# Patient Record
Sex: FEMALE | Race: WHITE
[De-identification: names, ages, dates, MRNs, and addresses within clinical notes are randomized per-mention and may not be internally consistent; named-entity substitution may affect disease eponyms.]

---

## 2018-12-10 ENCOUNTER — HOSPITAL ENCOUNTER (EMERGENCY)
Dept: HOSPITAL 74 - JER | Age: 33
Discharge: HOME | End: 2018-12-10
Payer: COMMERCIAL

## 2018-12-10 VITALS — DIASTOLIC BLOOD PRESSURE: 60 MMHG | TEMPERATURE: 98 F | SYSTOLIC BLOOD PRESSURE: 131 MMHG

## 2018-12-10 VITALS — BODY MASS INDEX: 18.5 KG/M2

## 2018-12-10 VITALS — HEART RATE: 71 BPM

## 2018-12-10 DIAGNOSIS — R09.89: Primary | ICD-10-CM

## 2018-12-10 LAB
APPEARANCE UR: CLEAR
BACTERIA #/AREA URNS HPF: (no result) /HPF
BILIRUB UR STRIP.AUTO-MCNC: NEGATIVE MG/DL
COLOR UR: (no result)
EPITH CASTS URNS QL MICRO: (no result) /HPF
KETONES UR QL STRIP: NEGATIVE
LEUKOCYTE ESTERASE UR QL STRIP.AUTO: NEGATIVE
MUCOUS THREADS URNS QL MICRO: (no result)
NITRITE UR QL STRIP: NEGATIVE
PH UR: 5 [PH] (ref 5–8)
PROT UR QL STRIP: NEGATIVE
PROT UR QL STRIP: NEGATIVE
SP GR UR: 1 (ref 1.01–1.03)
UROBILINOGEN UR STRIP-MCNC: NEGATIVE MG/DL (ref 0.2–1)

## 2018-12-10 NOTE — PDOC
Attending Attestation





- Resident


Resident Name: Maddy Adams





- ED Attending Attestation


I have performed the following: I have examined & evaluated the patient, The 

case was reviewed & discussed with the resident, I agree w/resident's findings 

& plan





- HPI


HPI: 





12/10/18 22:59








The patient is a 33 year old female, with no significant past medical history of

, who presents to the emergency department s/p foreign body choking. As per 

patient, she was eating when she began to choke on a cucumber. She endorses her 

 attempted to perform the Heimlich. On her way to the ED in the EMS, she 

notes the sensation went away and she believes she swallowed the cucumber.  





She denies recent fevers, chills, headache or dizziness. She denies recent 

nausea, vomit, diarrhea or constipation. She denies recent  dysuria, frequency, 

urgency or hematuria. She denies recent chest pain or shortness of breath.





Allergies: NKDA 


Past surgical history: None reported.


Social history: Nonsmoker. Denies EtOH use and recreational drug use. 











- Physicial Exam


PE: 





12/10/18 22:59








NAD, well appearing, +anxious, MMM, nl conjunctiva, anicteric; normal phonation

, airway intact, neck supple. lungs clear, no respiratory distress. RRR, 

abdomen soft nontender. HILLS x4. No peripheral edema. normal color for ethnicity

, WWP.











- Medical Decision Making





12/10/18 23:01





see hpi as documented


VS with mild tachy but also anxious. repeat VS normalized.


otherwise well appearing, maintaining secretions and airway.


lungs clear, 


+globus sensation


Soft tissue Xr and CXR clear, normal alignment and airway, no pulmonary edema 

or infiltrate.


tolerating PO intake at bedside


PCP followup, GI referral for eval of globus sensation and possible stricturing/

narrowing/shatski ring that may predispose


adequate chewing of food prior to swallowing


return precautions given, including bloody output, dehydration, vomiting,r 

espiratory distress, cyanosis, repeat episodes, or other worsening sx.


pt verbalized understanding of instructions, A/P.





12/10/18 23:03

## 2018-12-10 NOTE — PDOC
History of Present Illness





- General


Stated Complaint: Foreign Body (FB)


Time Seen by Provider: 12/10/18 20:49





- History of Present Illness


Initial Comments: 


Rafael Lopez is a 32yo otherwise healthy woman who presents with the sensation 

of choking. She reports that she was eating a salad at home tonight when she 

felt that she started choking on a piece of cucumber. She attempted to use a 

finger to remove the piece or to make herself vomit, but she lodged the 

cucumber farther into her esophagus/trachea. Her  then attempted to 

perform the Heimlich maneuver without improvement. She states that she was able 

to get "just a little" air throughout the incident. They called an ambulance, 

and she was brought to the ED. Ms Lopez reports that she felt the cucumber 

piece move just prior to arrival at the ED and believes that she has now 

swallowed it. She feels well currently. However, she still has a sensation of 

something "stuck" in the bottom of her throat, though she does report recent 

cold symptoms and thinks that it may be secondary to chest congestion. She 

feels that drinking water would entirely resolve her symptoms.








Past History





- Past Medical History


Allergies/Adverse Reactions: 


 Allergies











Allergy/AdvReac Type Severity Reaction Status Date / Time


 


No Known Allergies Allergy   Verified 12/10/18 21:03














- Suicide/Smoking/Psychosocial Hx


Smoking History: Never smoked


Have you smoked in the past 12 months: No


Information on smoking cessation initiated: No


Hx Alcohol Use: No


Drug/Substance Use Hx: No





**Review of Systems





- Review of Systems


Comments:: 


General: No fevers, no chills, no weight or appetite change, no malaise


HEENT: No changes in vision, no changes in hearing, no congestion, no sore 

throat


CV: No chest pain, no palpitations, no LE edema


Pulm: No SOB, no cough, no wheezing


GI: No nausea or vomiting, no change in bowel habits, no melena


: No frequency, no urgency, no dysuria


Musc: No back pain, no joint swelling, no recent injury


Skin: No rash, no lesions, no erythema


Endo: No excessive thirst, no heat/cold intolerance


Heme: No unusual bruising or bleeding, no swollen glands


Neuro: No syncope, no numbness/tingling, no focal weakness


Vasc: No claudication


Psych: No recent change in mood, no SI or HI











*Physical Exam





- Vital Signs


 Last Vital Signs











Temp Pulse Resp BP Pulse Ox


 


 98.0 F   105 H  20   131/60   100 


 


 12/10/18 21:03  12/10/18 21:03  12/10/18 21:03  12/10/18 21:03  12/10/18 21:03














- Physical Exam


Comments: 


General: Comfortable, no acute distress


HEENT: PERRL, EOMI, MMM, voice normal, normal neck ROM, no LAD


Cards: RRR, no murmur appreciated


Pulm: Comfortable on room air, clear to auscultation bilaterally


Abd: Soft, nontender, nondistended


Ext: Atraumatic. No LE edema. ROM intact. Strength 5/5 and equal bilaterally


Vasc: Extremities WWP. Palpable radial and pedal pulses bilaterally


Skin: Normal color, no rashes or lesions


Neuro: A&Ox3, CN grossly intact, normal speech, motor/sensory grossly intact 

and symmetric


Psych: Mood appropriate to situation











Moderate Sedation





- Procedure Monitoring


Vital Signs: 


Procedure Monitoring Vital Signs











Temperature  98.0 F   12/10/18 21:03


 


Pulse Rate  105 H  12/10/18 21:03


 


Respiratory Rate  20   12/10/18 21:03


 


Blood Pressure  131/60   12/10/18 21:03


 


O2 Sat by Pulse Oximetry (%)  100   12/10/18 21:03











ED Treatment Course





- RADIOLOGY


Radiology Studies Ordered: 














 Category Date Time Status


 


 CHEST PA & LAT [RAD] [facility]  12/10/18 20:59 Ordered


 


 NECK SOFT TISSUE [RAD] [facility]  12/10/18 20:58 Ordered














Medical Decision Making





- Medical Decision Making





12/10/18 21:20


Rafael Lopez is an otherwise healthy 32yo woman who presents after choking on 

a piece of cucumber at home tonight. Her symptoms resolved just prior to 

arrival in the ED.


- Breathing comfortably, no focal abnormalities on pulmonary exam


- Still has a foreign body sensation in her lower neck


- Neck soft tissue and chest xrays ordered for evaluation and to r/o continued 

foreign object


- Urine preg ordered prior to imaging; currently trying to get pregnant





12/11/18 23:04


- Xrays completed, reviewed, no acute abnormalities noted


- HR now in low 70's


- D/C home w/ referral to GI for continued throat discomfort





Discussed with Dr Jean.





Maddy Adams


PGY1








*DC/Admit/Observation/Transfer


Diagnosis at time of Disposition: 


 Choking sensation








- Discharge Dispostion


Disposition: HOME


Condition at time of disposition: Stable


Decision to Admit order: No





- Referrals


Referrals: 


Torsten Chin MD [Staff Physician] - 





- Patient Instructions


Printed Discharge Instructions:  DI for Choking Episode


Additional Instructions: 


Discharge Instructions:


You were seen in the emergency department following a choking episode at home. 

You had xrays to confirm that the piece of food was no longer stuck in your 

throat. 





Home Care:


- Make sure you chew all food thoroughly


- If you can breath, do not panic. You have time to seek medical attention. If 

you can cough, you can breath. 





Follow Up:


- Make an appointment to see your primary doctor within the next 1-2 weeks for 

follow up


- You have been referred to a GI doctor, Dr Chin. Call to make an 

appointment for within the next week. You may need additional testing if you 

continue to have the sensation of something stuck in your throat. 


- Seek immediate medical care if you have any medical emergencies including 

additional choking episodes





- Post Discharge Activity

## 2019-01-07 ENCOUNTER — HOSPITAL ENCOUNTER (EMERGENCY)
Dept: HOSPITAL 74 - FER | Age: 34
Discharge: HOME | End: 2019-01-07
Payer: COMMERCIAL
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
## 2019-01-07 DIAGNOSIS — T31.0: ICD-10-CM

## 2019-01-07 DIAGNOSIS — L03.113: ICD-10-CM
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
## 2019-01-07 DIAGNOSIS — T22.011A: Primary | ICD-10-CM

## 2019-01-07 NOTE — PDOC
History of Present Illness





<Zayra Zambrano - Last Filed: 01/07/19 22:40>





- History of Present Illness


Initial Comments: 





01/08/19 02:12


The patient is a 33 year old female, with no significant past medical history, 

who presents to the emergency department today complaining of a right forearm 

burn for 2 days. Patient notes she burned herself while trying to remove food 

from the oven, and hit her arm on the oven rack. Patient states the burned area 

became swollen and erythematous yesterday. She notes she is concerned that the 

burn may be infected, which promoted her visit to the ED today. Reports pain to 

the burn area. Denies blister formation. Tdap up to date.  





The patient denies chest pain, shortness of breath, headache and dizziness.


Denies fever, chills, nausea, vomit, diarrhea and constipation.


Denies dysuria, frequency, urgency and hematuria.





Allergies: NKA


Past surgical history: None reported


Social history: No reported








<Davina Mcgregor - Last Filed: 01/08/19 02:38>





- General


Chief Complaint: Burn


Stated Complaint: BURN TO LEFT FOREARM 2 DAYS AGO


Time Seen by Provider: 01/07/19 20:28





Past History





<Zayra Zambrano - Last Filed: 01/07/19 22:40>





- Past Medical History


COPD: No





- Immunization History


Immunization Up to Date: Yes





- Suicide/Smoking/Psychosocial Hx


Smoking History: Never smoked


Have you smoked in the past 12 months: No


Hx Alcohol Use: No


Drug/Substance Use Hx: No





<Davina Mcgregor - Last Filed: 01/08/19 02:38>





- Past Medical History


Allergies/Adverse Reactions: 


 Allergies











Allergy/AdvReac Type Severity Reaction Status Date / Time


 


No Known Allergies Allergy   Verified 12/10/18 21:03











Home Medications: 


Ambulatory Orders





Cephalexin Monohydrate [Keflex -] 500 mg PO Q6H #28 capsule 01/07/19 











**Review of Systems





- Review of Systems


Comments:: 





01/08/19 02:13


GENERAL/CONSTITUTIONAL: No fever or chills. No weakness.


HEAD, EYES, EARS, NOSE AND THROAT: No change in vision. No ear pain or 

discharge. No sore throat.


GASTROINTESTINAL:  No nausea, vomiting, diarrhea or constipation.


GENITOURINARY: No dysuria, frequency, or change in urination.


CARDIOVASCULAR: No chest pain or shortness of breath.


RESPIRATORY: No cough, wheezing, or hemoptysis.


MUSCULOSKELETAL: No joint or muscle swelling or pain. No neck or back pain.


SKIN: +Right forearm burn with associated swelling and erythema. No rash


NEUROLOGIC: No headache, vertigo, loss of consciousness, or change in strength/

sensation.


ENDOCRINE: No increased thirst. No abnormal weight change.


HEMATOLOGIC/LYMPHATIC: No anemia, easy bleeding, or history of blood clots.


ALLERGIC/IMMUNOLOGIC: No hives or skin allergy.





<Davina Mcgregor - Last Filed: 01/08/19 02:38>





*Physical Exam





- Physical Exam


Comments: 





01/08/19 02:14


GENERAL: Awake, alert, and fully oriented, in no acute distress


HEAD: No signs of trauma


EYES: PERRLA, EOMI, sclera anicteric, conjunctiva clear


ENT: Hearing grossly normal. Moist mucosa


LUNGS: Breath sounds equal, clear to auscultation bilaterally. No wheezes, and 

no crackles


HEART: Regular rate and rhythm, normal S1 and S2, no murmurs, rubs or gallops


ABDOMEN: Soft, nontender, normoactive bowel sounds. No guarding, no rebound. No 

masses


EXTREMITIES: Normal range of motion, WWP


NEUROLOGICAL: Normal speech, cranial nerves intact, normal gait


SKIN: +3cm tender linear erythamous patch to the R posterior forearm 

superimposed on round area of mild erythema and edema. 





<Davina Mcgregor - Last Filed: 01/08/19 02:38>





ED Treatment Course





- Medications


Given in the ED: 


ED Medications














Discontinued Medications














Generic Name Dose Route Start Last Admin





  Trade Name Freq  PRN Reason Stop Dose Admin


 


Cephalexin HCl  500 mg  01/07/19 20:31  01/07/19 20:33





  Keflex -  PO  01/07/19 20:32  500 mg





  ONCE ONE   Administration





     





     





     





     














<Zayra Zambrano - Last Filed: 01/07/19 22:40>





Medical Decision Making





- Medical Decision Making





01/08/19 02:17


34yo F presents to the ED with first degree scald burn to R foream with 

surrounding swelling/erythema. Possible early cellulitis vs inflammatory 

response. Tdap within last 10 years. Plan to treat with keflex. Pt well 

appearing otherwise, clinically stable for DC home with PMD f.u





I discussed the physical exam findings, ancillary test results and final 

diagnoses with the patient. I answered all of the patient's questions. The 

patient was satisfied with the care received and felt comfortable with the 

discharge plan and treatment plan. The patient will call their primary care 

physician within 24 hours to arrange follow-up and will return to the Emergency 

Department with any new, persistent or worsening symptoms. 





<Davina Mcgregor - Last Filed: 01/08/19 02:38>





*DC/Admit/Observation/Transfer





- Attestations


Scribe Attestion: 


Documentation prepared by VANITA Bahena, acting as medical scribe for 

Davina Mcgregor MD.





01/07/19 22:40





<Zayra Zambrano - Last Filed: 01/07/19 22:40>





- Discharge Dispostion


Decision to Admit order: No





- Attestations


Physician Attestion: 





01/07/19 20:32








I, Dr. Davina Mcgregor MD, attest that this document has been prepared under my 

direction and personally reviewed by me in its entirety.   I further attest, 

that it accurately reflects all work, treatment, procedures and medical decision

-making performed by me.  





<Davina Mcgregor - Last Filed: 01/08/19 02:38>


Diagnosis at time of Disposition: 


 Burn, Skin abnormality, Cellulitis








- Discharge Dispostion


Disposition: HOME


Condition at time of disposition: Stable





- Prescriptions


Prescriptions: 


Cephalexin Monohydrate [Keflex -] 500 mg PO Q6H #28 capsule





- Patient Instructions


Printed Discharge Instructions:  How to Take Care of a Burn, DI for [place]


Additional Instructions: 


Apply bacitracin to the burn twice a day. 


Take the antibiotics as prescribed 


Follow up with your primary doctor within 2-3 days


Return to the emergency department if you have any new, worsening or concerning 

symptoms

## 2019-02-19 ENCOUNTER — HOSPITAL ENCOUNTER (EMERGENCY)
Dept: HOSPITAL 74 - FER | Age: 34
Discharge: HOME | End: 2019-02-19
Payer: COMMERCIAL

## 2019-02-19 VITALS — DIASTOLIC BLOOD PRESSURE: 63 MMHG | HEART RATE: 75 BPM | TEMPERATURE: 98.5 F | SYSTOLIC BLOOD PRESSURE: 121 MMHG

## 2019-02-19 VITALS — BODY MASS INDEX: 19.5 KG/M2

## 2019-02-19 DIAGNOSIS — R10.11: Primary | ICD-10-CM

## 2019-02-19 LAB
ALBUMIN SERPL-MCNC: 4 G/DL (ref 3.4–5)
ALP SERPL-CCNC: 68 U/L (ref 45–117)
ALT SERPL-CCNC: 41 U/L (ref 13–61)
AMYLASE SERPL-CCNC: 57 U/L (ref 25–115)
ANION GAP SERPL CALC-SCNC: 10 MMOL/L (ref 8–16)
APPEARANCE UR: (no result)
AST SERPL-CCNC: 47 U/L (ref 15–37)
BACTERIA #/AREA URNS HPF: (no result) /HPF
BILIRUB SERPL-MCNC: 0.6 MG/DL (ref 0.2–1)
BILIRUB UR STRIP.AUTO-MCNC: NEGATIVE MG/DL
BUN SERPL-MCNC: 8 MG/DL (ref 7–18)
CALCIUM SERPL-MCNC: 9.5 MG/DL (ref 8.5–10)
CHLORIDE SERPL-SCNC: 101 MMOL/L (ref 98–107)
CO2 SERPL-SCNC: 22 MMOL/L (ref 21–32)
COLOR UR: YELLOW
CREAT SERPL-MCNC: 0.5 MG/DL (ref 0.55–1.3)
DEPRECATED RDW RBC AUTO: 13.1 % (ref 11.6–15.6)
EPITH CASTS URNS QL MICRO: (no result) /HPF
GLUCOSE SERPL-MCNC: 88 MG/DL (ref 74–106)
HCG UR QL: NEGATIVE
HCT VFR BLD CALC: 38.4 % (ref 32.4–45.2)
HGB BLD-MCNC: 12.5 GM/DL (ref 10.7–15.3)
KETONES UR QL STRIP: (no result)
LEUKOCYTE ESTERASE UR QL STRIP.AUTO: NEGATIVE
LIPASE SERPL-CCNC: 174 U/L (ref 73–393)
MCH RBC QN AUTO: 29.3 PG (ref 25.7–33.7)
MCHC RBC AUTO-ENTMCNC: 32.6 G/DL (ref 32–36)
MCV RBC: 89.8 FL (ref 80–96)
NITRITE UR QL STRIP: NEGATIVE
PH UR: 5 [PH] (ref 4.5–8)
PLATELET # BLD AUTO: 258 K/MM3 (ref 134–434)
PMV BLD: 11.5 FL (ref 7.5–11.1)
POTASSIUM SERPLBLD-SCNC: 3.8 MMOL/L (ref 3.5–5.1)
PROT SERPL-MCNC: 7.6 G/DL (ref 6.4–8.2)
PROT UR QL STRIP: NEGATIVE
PROT UR QL STRIP: NEGATIVE
RBC # BLD AUTO: (no result) /HPF (ref 0–3)
RBC # BLD AUTO: 4.28 M/MM3 (ref 3.6–5.2)
SODIUM SERPL-SCNC: 133 MMOL/L (ref 136–145)
SP GR UR: 1.02 (ref 1.01–1.03)
UROBILINOGEN UR STRIP-MCNC: 0.2 MG/DL (ref 0.2–1)
WBC # BLD AUTO: 10.8 K/MM3 (ref 4–10.8)
WBC # UR AUTO: (no result) /UL (ref 0–5)

## 2019-02-19 PROCEDURE — 3E033GC INTRODUCTION OF OTHER THERAPEUTIC SUBSTANCE INTO PERIPHERAL VEIN, PERCUTANEOUS APPROACH: ICD-10-PCS

## 2019-02-19 NOTE — PDOC
History of Present Illness





- General


Chief Complaint: Diarrhea


Stated Complaint: DIARRHEA


Time Seen by Provider: 02/19/19 16:30





- History of Present Illness


Initial Comments: 


33 year odl female wtih PMH of appendectomy (as a chidl) presenting with 

epigastrium abdominal pain with radiation to the back and nausea and diarrhea 

for the past since this AM. She had a mild headache earlier as well. Describes 

her diarrhea as non bloody. Her stomach pain is crampy and upper right qudrant 

without prandial relation and radiating to the back. Denies any fevrs chilsl, 

headache, chest pain, or other symptoms


02/19/19 16:46








Past History





- Past Medical History


Allergies/Adverse Reactions: 


 Allergies











Allergy/AdvReac Type Severity Reaction Status Date / Time


 


No Known Allergies Allergy   Verified 02/19/19 16:18











Home Medications: 


Ambulatory Orders





NK [No Known Home Medication]  02/19/19 








COPD: No


Other medical history: ECTOPIC PREG





- Surgical History


Appendectomy: Yes





- Immunization History


Immunization Up to Date: Yes





- Suicide/Smoking/Psychosocial Hx


Smoking History: Never smoked


Have you smoked in the past 12 months: No


Hx Alcohol Use: No


Drug/Substance Use Hx: No





*Physical Exam





- Vital Signs


 Last Vital Signs











Temp Pulse Resp BP Pulse Ox


 


          0/0 L   


 


          02/19/19 16:18   














Moderate Sedation





- Procedure Monitoring


Vital Signs: 


Procedure Monitoring Vital Signs











Temperature      


 


Pulse Rate      


 


Respiratory Rate      


 


Blood Pressure  0/0 L  02/19/19 16:18


 


O2 Sat by Pulse Oximetry (%)      











ED Treatment Course





- LABORATORY


CBC & Chemistry Diagram: 


 02/19/19 17:24





 02/19/19 17:24





*DC/Admit/Observation/Transfer





- Discharge Dispostion


Condition at time of disposition: Stable





- Referrals





- Patient Instructions





- Post Discharge Activity

## 2019-02-19 NOTE — PDOC
Attending Attestation





- Resident


Resident Name: Devaughn Pham





- ED Attending Attestation


I have performed the following: I have examined & evaluated the patient, The 

case was reviewed & discussed with the resident, I agree w/resident's findings 

& plan





- HPI


HPI: 





02/19/19 17:22


Healthy 33-year-old female with history of childhood appendectomy, distant 

history of GERD presents with episode of epigastric/back pain since this 

morning. Patient was in usual state of normal health, awoke this morning with 

slight back discomfort that acutely worsened around 11 AM and described as 

epigastric/back discomfort that was severe, associated with nausea, 

intermittent since then. Some diarrhea but no actual vomiting, chills but no 

measured fever. Presents for evaluation.


Never had pain like this before, it began about 2 hours after eating some 

chocolate and popcorn for breakfast. No known history of kidney stones or 

gallstones, denies any urinary complaints.





- Physicial Exam


PE: 





02/19/19 17:24


Afebrile, vital signs normal


Well-appearing, smiling, no jaundice or pallor


Moist mucosa


Heart is regular, lungs are clear


Abdomen is soft and nondistended, tender in the right abdomen with guarding at 

the right upper quadrant, positive Palumbo's on exam. Also with suprapubic 

discomfort to palpation and mild bilateral CVA discomfort. No rashes.





- Medical Decision Making





02/19/19 17:25


33-year-old female healthy about 14 months postpartum with acute epigastric/

back pain that has been intermittent since this morning, associated with nausea 

and right upper quadrant tenderness on exam. Also with suprapubic/CVA 

discomfort. Question biliary etiology versus urinary etiology, certainly 

colicky nature. 


Labs, urinalysis, urine pregnancy


Right upper quadrant ultrasound


Pain control


Reassess

## 2019-02-19 NOTE — PDOC
History of Present Illness





- General


Chief Complaint: Diarrhea


Stated Complaint: DIARRHEA


Time Seen by Provider: 02/19/19 16:30


History Source: Patient


Exam Limitations: No Limitations





- History of Present Illness


Initial Comments: 





  This is a 33-year-old female whose care was transferred to me from Dr. Swift 1900 hrs. Patient came in complaining of right upper quadrant abdominal 

pain that appeared to be colicky in nature. Patient had some tenderness on 

palpation and a workup that included labs and ultrasound and CAT scan. Patient'

s CAT scan is still pending however her ultrasound did show some 

hydronephrosis. Patient is comfortable at this time.





20:20


Reevaluation patient remains comfortable CAT scan is negative for any acute 

pathology patient was given copies of CAT scan discuss finding this patient 

patient's sister in attendance at the bedside well take patient home. Patient 

does have a primary care doctor she can follow up with.





Past History





- Past Medical History


Allergies/Adverse Reactions: 


 Allergies











Allergy/AdvReac Type Severity Reaction Status Date / Time


 


No Known Allergies Allergy   Verified 02/19/19 16:18











Home Medications: 


Ambulatory Orders





NK [No Known Home Medication]  02/19/19 








COPD: No


Other medical history: ECTOPIC PREG





- Surgical History


Appendectomy: Yes





- Immunization History


Immunization Up to Date: Yes





- Suicide/Smoking/Psychosocial Hx


Smoking History: Never smoked


Have you smoked in the past 12 months: No


Hx Alcohol Use: No


Drug/Substance Use Hx: No





*Physical Exam





- Vital Signs


 Last Vital Signs











Temp Pulse Resp BP Pulse Ox


 


 98.5 F   75   16   121/63   100 


 


 02/19/19 16:18  02/19/19 16:18  02/19/19 16:18  02/19/19 16:18  02/19/19 16:18














Moderate Sedation





- Procedure Monitoring


Vital Signs: 


Procedure Monitoring Vital Signs











Temperature  98.5 F   02/19/19 16:18


 


Pulse Rate  75   02/19/19 16:18


 


Respiratory Rate  16   02/19/19 16:18


 


Blood Pressure  121/63   02/19/19 16:18


 


O2 Sat by Pulse Oximetry (%)  100   02/19/19 16:18











ED Treatment Course





- LABORATORY


CBC & Chemistry Diagram: 


 02/19/19 17:24





 02/19/19 17:24





- ADDITIONAL ORDERS


Additional order review: 


 Laboratory  Results











  02/19/19 02/19/19 02/19/19





  17:24 17:24 16:28


 


Sodium   133 L 


 


Potassium   3.8 


 


Chloride   101 


 


Carbon Dioxide   22 


 


Anion Gap   10 


 


BUN   8 


 


Creatinine   0.5 L 


 


Creat Clearance w eGFR   > 60 


 


Random Glucose   88 


 


Calcium   9.5 


 


Total Bilirubin   0.6 


 


Direct Bilirubin  0.1  


 


AST   47 H 


 


ALT   41 


 


Alkaline Phosphatase   68 


 


Total Protein   7.6 


 


Albumin   4.0 


 


Total Amylase   57 


 


Lipase   174 


 


Urine Color    Yellow


 


Urine Appearance    Sl cloudy


 


Urine pH    5.0


 


Ur Specific Gravity    1.020


 


Urine Protein    Negative


 


Urine Glucose (UA)    Negative


 


Urine Ketones    1+ H


 


Urine Blood    Trace H


 


Urine Nitrite    Negative


 


Urine Bilirubin    Negative


 


Urine Urobilinogen    0.2


 


Ur Leukocyte Esterase    Negative


 


Urine RBC    5-10


 


Urine WBC    0-2


 


Ur Epithelial Cells    1+


 


Urine Bacteria    None seen


 


Urine HCG, Qual    Negative








 











  02/19/19





  17:24


 


RBC  4.28


 


MCV  89.8


 


MCHC  32.6


 


RDW  13.1


 


MPV  11.5 H














- Medications


Given in the ED: 


ED Medications














Discontinued Medications














Generic Name Dose Route Start Last Admin





  Trade Name Jean Pierreq  PRN Reason Stop Dose Admin


 


Al Hydroxide/Mg Hydroxide  30 ml  02/19/19 17:02  02/19/19 18:23





  Mylanta Oral Suspension -  PO  02/19/19 17:03  30 ml





  ONCE ONE   Administration





     





     





     





     


 


Famotidine/Sodium Chloride  20 mg in 50 mls @ 100 mls/hr  02/19/19 17:02  02/19/ 19 18:23





  Pepcid 20 Mg Premixed Ivpb -  IVPB  02/19/19 17:31  100 mls/hr





  ONCE ONE   Administration





     





     





     





     














*DC/Admit/Observation/Transfer


Diagnosis at time of Disposition: 


Abdominal pain


Qualifiers:


 Abdominal location: right upper quadrant Qualified Code(s): R10.11 - Right 

upper quadrant pain








- Discharge Dispostion


Disposition: HOME


Condition at time of disposition: Stable


Decision to Admit order: No





- Referrals





- Patient Instructions


Additional Instructions: 


Return to the emergency department immediately with ANY new, persistent or 

worsening symptoms.





Continue any medications as previously prescribed by your physician.





You should follow up with your primary doctor as soon as possible regarding 

today's emergency department visit.


.


Please make sure your doctor reviews the results of your emergency evaluation.





Thank you for coming to the   Emergency Department today for your care. It was 

a pleasure to see you today. Please note that your evaluation is INCOMPLETE 

until you  follow-up with your doctor. 





When you follow-up with your doctor make sure you take copies of the results 

with you.





- Post Discharge Activity